# Patient Record
Sex: FEMALE | Race: BLACK OR AFRICAN AMERICAN | NOT HISPANIC OR LATINO | Employment: STUDENT | URBAN - METROPOLITAN AREA
[De-identification: names, ages, dates, MRNs, and addresses within clinical notes are randomized per-mention and may not be internally consistent; named-entity substitution may affect disease eponyms.]

---
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
## 2023-01-02 ENCOUNTER — PATIENT MESSAGE (OUTPATIENT)
Dept: DERMATOLOGY | Facility: CLINIC | Age: 18
End: 2023-01-02
Payer: COMMERCIAL

## 2023-01-03 DIAGNOSIS — L20.9 ATOPIC DERMATITIS, UNSPECIFIED TYPE: ICD-10-CM

## 2023-01-03 RX ORDER — CRISABOROLE 20 MG/G
1 OINTMENT TOPICAL 2 TIMES DAILY
Qty: 100 G | Refills: 3 | Status: SHIPPED | OUTPATIENT
Start: 2023-01-03

## 2023-01-05 ENCOUNTER — TELEPHONE (OUTPATIENT)
Dept: PHARMACY | Facility: CLINIC | Age: 18
End: 2023-01-05
Payer: COMMERCIAL

## 2023-04-13 ENCOUNTER — OFFICE VISIT (OUTPATIENT)
Dept: DERMATOLOGY | Facility: CLINIC | Age: 18
End: 2023-04-13
Payer: COMMERCIAL

## 2023-04-13 DIAGNOSIS — L70.0 ACNE VULGARIS: Primary | ICD-10-CM

## 2023-04-13 DIAGNOSIS — L81.9 HYPERPIGMENTATION: ICD-10-CM

## 2023-04-13 DIAGNOSIS — L85.8 KP (KERATOSIS PILARIS): ICD-10-CM

## 2023-04-13 PROCEDURE — 99999 PR PBB SHADOW E&M-EST. PATIENT-LVL III: CPT | Mod: PBBFAC,,, | Performed by: DERMATOLOGY

## 2023-04-13 PROCEDURE — 99214 PR OFFICE/OUTPT VISIT, EST, LEVL IV, 30-39 MIN: ICD-10-PCS | Mod: S$GLB,,, | Performed by: DERMATOLOGY

## 2023-04-13 PROCEDURE — 99999 PR PBB SHADOW E&M-EST. PATIENT-LVL III: ICD-10-PCS | Mod: PBBFAC,,, | Performed by: DERMATOLOGY

## 2023-04-13 PROCEDURE — 99213 OFFICE O/P EST LOW 20 MIN: CPT | Mod: PBBFAC,PN | Performed by: DERMATOLOGY

## 2023-04-13 PROCEDURE — 99214 OFFICE O/P EST MOD 30 MIN: CPT | Mod: S$GLB,,, | Performed by: DERMATOLOGY

## 2023-04-13 RX ORDER — TRETINOIN 1 MG/G
CREAM TOPICAL NIGHTLY
Qty: 20 G | Refills: 1 | Status: SHIPPED | OUTPATIENT
Start: 2023-04-13 | End: 2024-02-09 | Stop reason: SDUPTHER

## 2023-04-13 RX ORDER — CLINDAMYCIN PHOSPHATE 10 UG/ML
LOTION TOPICAL EVERY MORNING
Qty: 60 ML | Refills: 1 | Status: SHIPPED | OUTPATIENT
Start: 2023-04-13 | End: 2024-02-09 | Stop reason: SDUPTHER

## 2023-04-13 NOTE — PROGRESS NOTES
Subjective:      Patient ID:  Lily Samson is a 17 y.o. female who presents for   Chief Complaint   Patient presents with    Acne     Follow up     Acne - Follow-up  Symptom course: worsening  Affected locations: face, back and neck  Signs / symptoms: asymptomatic  Severity: moderate to severe    Review of Systems   Constitutional:  Negative for fever and chills.   HENT:  Negative for sore throat.    Respiratory:  Negative for cough.      Objective:   Physical Exam   Constitutional: She appears well-developed and well-nourished.   Neurological: She is alert and oriented to person, place, and time.   Psychiatric: She has a normal mood and affect.   Skin:                  Diagram Legend     Erythematous scaling macule/papule c/w actinic keratosis       Vascular papule c/w angioma      Pigmented verrucoid papule/plaque c/w seborrheic keratosis      Yellow umbilicated papule c/w sebaceous hyperplasia      Irregularly shaped tan macule c/w lentigo     1-2 mm smooth white papules consistent with Milia      Movable subcutaneous cyst with punctum c/w epidermal inclusion cyst      Subcutaneous movable cyst c/w pilar cyst      Firm pink to brown papule c/w dermatofibroma      Pedunculated fleshy papule(s) c/w skin tag(s)      Evenly pigmented macule c/w junctional nevus     Mildly variegated pigmented, slightly irregular-bordered macule c/w mildly atypical nevus      Flesh colored to evenly pigmented papule c/w intradermal nevus       Pink pearly papule/plaque c/w basal cell carcinoma      Erythematous hyperkeratotic cursted plaque c/w SCC      Surgical scar with no sign of skin cancer recurrence      Open and closed comedones      Inflammatory papules and pustules      Verrucoid papule consistent consistent with wart     Erythematous eczematous patches and plaques     Dystrophic onycholytic nail with subungual debris c/w onychomycosis     Umbilicated papule    Erythematous-base heme-crusted tan verrucoid plaque consistent  with inflamed seborrheic keratosis     Erythematous Silvery Scaling Plaque c/w Psoriasis     See annotation            Assessment / Plan:        Acne vulgaris  -     tretinoin (RETIN-A) 0.1 % cream; Apply topically every evening. Start with every other night and move up to nightly after 2 weeks if not too dry.  Dispense: 20 g; Refill: 1  Overall better but slow.  Reviewed with patient different treatment options and associated risks.  Proper application of medications and or care for affected area(s) and condition(s) reviewed.  Independent historian was in exam room or on virtual today to provide information and assist in delivering therapy and treatment at home.  Bump up RA to 0.1.  Proper application of medications and or care for affected area(s) and condition(s) reviewed.  No picking.  Refill cleocin lotion.    Hyperpigmentation  Discussed with the patient the risk of color scars, erythema, or hyperpigmentation that could take months to resolve.    KP (keratosis pilaris)  Better but pt wants to be more aggressive.  Recommend Glytone products for proven effects on collagen production with alpha hydroxy products.  Discussed getting and usage of a scrub brush for the back and loofa for the chest and shoulders and other body parts.             Follow up in about 4 months (around 8/13/2023).

## 2024-01-29 DIAGNOSIS — L70.0 ACNE VULGARIS: ICD-10-CM

## 2024-01-30 ENCOUNTER — PATIENT MESSAGE (OUTPATIENT)
Dept: DERMATOLOGY | Facility: CLINIC | Age: 19
End: 2024-01-30
Payer: COMMERCIAL

## 2024-01-30 RX ORDER — CLINDAMYCIN PHOSPHATE 10 UG/ML
LOTION TOPICAL
Qty: 60 ML | Refills: 1 | OUTPATIENT
Start: 2024-01-30

## 2024-02-08 ENCOUNTER — OFFICE VISIT (OUTPATIENT)
Dept: DERMATOLOGY | Facility: CLINIC | Age: 19
End: 2024-02-08
Payer: COMMERCIAL

## 2024-02-08 DIAGNOSIS — L70.0 ACNE VULGARIS: Primary | ICD-10-CM

## 2024-02-08 DIAGNOSIS — L81.9 HYPERPIGMENTATION: ICD-10-CM

## 2024-02-08 PROCEDURE — 99214 OFFICE O/P EST MOD 30 MIN: CPT | Mod: 95,,, | Performed by: DERMATOLOGY

## 2024-02-08 PROCEDURE — 1159F MED LIST DOCD IN RCRD: CPT | Mod: CPTII,95,, | Performed by: DERMATOLOGY

## 2024-02-08 PROCEDURE — 1160F RVW MEDS BY RX/DR IN RCRD: CPT | Mod: CPTII,95,, | Performed by: DERMATOLOGY

## 2024-02-08 NOTE — PROGRESS NOTES
The patient location is: home  The chief complaint leading to consultation is: acne fu for the face.    Visit type: audiovisual    Face to Face time with patient: 7 m  10 minutes of total time spent on the encounter, which includes face to face time and non-face to face time preparing to see the patient (eg, review of tests), Obtaining and/or reviewing separately obtained history, Documenting clinical information in the electronic or other health record, Independently interpreting results (not separately reported) and communicating results to the patient/family/caregiver, or Care coordination (not separately reported).         Each patient to whom he or she provides medical services by telemedicine is:  (1) informed of the relationship between the physician and patient and the respective role of any other health care provider with respect to management of the patient; and (2) notified that he or she may decline to receive medical services by telemedicine and may withdraw from such care at any time.    Notes:     Subjective:      Patient ID:  Lily Samson is a 18 y.o. female who presents for No chief complaint on file.    HPI    Review of Systems   Constitutional:  Negative for fever.   HENT:  Negative for sore throat.    Respiratory:  Negative for cough.        Objective:   Physical Exam   Constitutional: She appears well-developed and well-nourished.   Eyes: No conjunctival no injection.   Neurological: She is alert and oriented to person, place, and time.   Psychiatric: She has a normal mood and affect.        Diagram Legend     Erythematous scaling macule/papule c/w actinic keratosis       Vascular papule c/w angioma      Pigmented verrucoid papule/plaque c/w seborrheic keratosis      Yellow umbilicated papule c/w sebaceous hyperplasia      Irregularly shaped tan macule c/w lentigo     1-2 mm smooth white papules consistent with Milia      Movable subcutaneous cyst with punctum c/w epidermal inclusion cyst       Subcutaneous movable cyst c/w pilar cyst      Firm pink to brown papule c/w dermatofibroma      Pedunculated fleshy papule(s) c/w skin tag(s)      Evenly pigmented macule c/w junctional nevus     Mildly variegated pigmented, slightly irregular-bordered macule c/w mildly atypical nevus      Flesh colored to evenly pigmented papule c/w intradermal nevus       Pink pearly papule/plaque c/w basal cell carcinoma      Erythematous hyperkeratotic cursted plaque c/w SCC      Surgical scar with no sign of skin cancer recurrence      Open and closed comedones      Inflammatory papules and pustules      Verrucoid papule consistent consistent with wart     Erythematous eczematous patches and plaques     Dystrophic onycholytic nail with subungual debris c/w onychomycosis     Umbilicated papule    Erythematous-base heme-crusted tan verrucoid plaque consistent with inflamed seborrheic keratosis     Erythematous Silvery Scaling Plaque c/w Psoriasis     See annotation            Assessment / Plan:        Acne vulgaris  -     clindamycin (CLEOCIN T) 1 % lotion; Apply topically every morning. face  Dispense: 60 mL; Refill: 3  -     tretinoin (RETIN-A) 0.1 % cream; Apply topically every evening. Start with every other night and move up to nightly after 2 weeks if not too dry.  Dispense: 20 g; Refill: 3  Refill tops.  Condition is stable.  We will continue present management.    Reviewed with patient different treatment options and associated risks.  Proper application of medications and or care for affected area(s) and condition(s) reviewed.  Cons aldactone later?  Pt to cons.  Pt reports some natalie with periods.    Discussed benefits and risks of therapy including but not limited to breakthrough bleeding, breast tenderness, and elevated potassium levels which may give symptoms of fatigue, palpitations, and nausea. Patient should limit potassium intake - avoid potassium supplements or salt substitutes, limit bananas and citrus fruits.  Pregnancy must be avoided while taking spironolactone.  Rev'd winlevi as poss costly and questionable efficacy.    Hyperpigmentation  Cons hq focally later?  Patient instructed in importance in daily sun protection. Sun avoidance and topical protection discussed.     Patient encouraged to wear hat for all outdoor exposure.     Also discussed sun protective clothing.  RA should also help.             Follow up in about 1 year (around 2/8/2025).

## 2024-02-09 RX ORDER — CLINDAMYCIN PHOSPHATE 10 UG/ML
LOTION TOPICAL EVERY MORNING
Qty: 60 ML | Refills: 3 | Status: SHIPPED | OUTPATIENT
Start: 2024-02-09 | End: 2024-04-18 | Stop reason: SDUPTHER

## 2024-02-09 RX ORDER — TRETINOIN 1 MG/G
CREAM TOPICAL NIGHTLY
Qty: 20 G | Refills: 3 | Status: SHIPPED | OUTPATIENT
Start: 2024-02-09 | End: 2024-04-18 | Stop reason: SDUPTHER

## 2024-02-19 ENCOUNTER — PATIENT MESSAGE (OUTPATIENT)
Dept: DERMATOLOGY | Facility: CLINIC | Age: 19
End: 2024-02-19
Payer: COMMERCIAL

## 2024-04-18 ENCOUNTER — LAB VISIT (OUTPATIENT)
Dept: LAB | Facility: OTHER | Age: 19
End: 2024-04-18
Attending: INTERNAL MEDICINE
Payer: COMMERCIAL

## 2024-04-18 ENCOUNTER — OFFICE VISIT (OUTPATIENT)
Dept: DERMATOLOGY | Facility: CLINIC | Age: 19
End: 2024-04-18
Payer: COMMERCIAL

## 2024-04-18 ENCOUNTER — OFFICE VISIT (OUTPATIENT)
Dept: INTERNAL MEDICINE | Facility: CLINIC | Age: 19
End: 2024-04-18
Attending: INTERNAL MEDICINE
Payer: COMMERCIAL

## 2024-04-18 VITALS
WEIGHT: 141.13 LBS | HEIGHT: 63 IN | DIASTOLIC BLOOD PRESSURE: 76 MMHG | SYSTOLIC BLOOD PRESSURE: 112 MMHG | OXYGEN SATURATION: 99 % | BODY MASS INDEX: 25.01 KG/M2 | HEART RATE: 76 BPM

## 2024-04-18 DIAGNOSIS — L60.8 MELANONYCHIA: Primary | ICD-10-CM

## 2024-04-18 DIAGNOSIS — R53.83 FATIGUE, UNSPECIFIED TYPE: ICD-10-CM

## 2024-04-18 DIAGNOSIS — Z00.00 ANNUAL PHYSICAL EXAM: Primary | ICD-10-CM

## 2024-04-18 DIAGNOSIS — L70.0 ACNE VULGARIS: ICD-10-CM

## 2024-04-18 DIAGNOSIS — K59.00 CONSTIPATION, UNSPECIFIED CONSTIPATION TYPE: ICD-10-CM

## 2024-04-18 DIAGNOSIS — Z00.00 ANNUAL PHYSICAL EXAM: ICD-10-CM

## 2024-04-18 DIAGNOSIS — R06.83 SNORING: ICD-10-CM

## 2024-04-18 LAB
25(OH)D3+25(OH)D2 SERPL-MCNC: 6 NG/ML (ref 30–96)
ALBUMIN SERPL BCP-MCNC: 4.4 G/DL (ref 3.5–5.2)
ALP SERPL-CCNC: 55 U/L (ref 55–135)
ALT SERPL W/O P-5'-P-CCNC: 8 U/L (ref 10–44)
ANION GAP SERPL CALC-SCNC: 9 MMOL/L (ref 8–16)
AST SERPL-CCNC: 17 U/L (ref 10–40)
BASOPHILS # BLD AUTO: 0.1 K/UL (ref 0–0.2)
BASOPHILS NFR BLD: 1.5 % (ref 0–1.9)
BILIRUB SERPL-MCNC: 0.7 MG/DL (ref 0.1–1)
BUN SERPL-MCNC: 11 MG/DL (ref 6–20)
CALCIUM SERPL-MCNC: 9.4 MG/DL (ref 8.7–10.5)
CHLORIDE SERPL-SCNC: 109 MMOL/L (ref 95–110)
CO2 SERPL-SCNC: 19 MMOL/L (ref 23–29)
CREAT SERPL-MCNC: 0.8 MG/DL (ref 0.5–1.4)
DIFFERENTIAL METHOD BLD: ABNORMAL
EOSINOPHIL # BLD AUTO: 0.3 K/UL (ref 0–0.5)
EOSINOPHIL NFR BLD: 4 % (ref 0–8)
ERYTHROCYTE [DISTWIDTH] IN BLOOD BY AUTOMATED COUNT: 11.9 % (ref 11.5–14.5)
EST. GFR  (NO RACE VARIABLE): >60 ML/MIN/1.73 M^2
ESTIMATED AVG GLUCOSE: 91 MG/DL (ref 68–131)
FOLATE SERPL-MCNC: 7.8 NG/ML (ref 4–24)
GLUCOSE SERPL-MCNC: 74 MG/DL (ref 70–110)
HBA1C MFR BLD: 4.8 % (ref 4–5.6)
HCT VFR BLD AUTO: 40.3 % (ref 37–48.5)
HGB BLD-MCNC: 12.8 G/DL (ref 12–16)
IMM GRANULOCYTES # BLD AUTO: 0.01 K/UL (ref 0–0.04)
IMM GRANULOCYTES NFR BLD AUTO: 0.2 % (ref 0–0.5)
LYMPHOCYTES # BLD AUTO: 3.4 K/UL (ref 1–4.8)
LYMPHOCYTES NFR BLD: 51.9 % (ref 18–48)
MCH RBC QN AUTO: 31.6 PG (ref 27–31)
MCHC RBC AUTO-ENTMCNC: 31.8 G/DL (ref 32–36)
MCV RBC AUTO: 100 FL (ref 82–98)
MONOCYTES # BLD AUTO: 0.5 K/UL (ref 0.3–1)
MONOCYTES NFR BLD: 8 % (ref 4–15)
NEUTROPHILS # BLD AUTO: 2.2 K/UL (ref 1.8–7.7)
NEUTROPHILS NFR BLD: 34.4 % (ref 38–73)
NRBC BLD-RTO: 0 /100 WBC
PLATELET # BLD AUTO: 210 K/UL (ref 150–450)
PMV BLD AUTO: 12.7 FL (ref 9.2–12.9)
POTASSIUM SERPL-SCNC: 3.8 MMOL/L (ref 3.5–5.1)
PROT SERPL-MCNC: 8.1 G/DL (ref 6–8.4)
RBC # BLD AUTO: 4.05 M/UL (ref 4–5.4)
SODIUM SERPL-SCNC: 137 MMOL/L (ref 136–145)
TSH SERPL DL<=0.005 MIU/L-ACNC: 1.38 UIU/ML (ref 0.4–4)
VIT B12 SERPL-MCNC: 361 PG/ML (ref 210–950)
WBC # BLD AUTO: 6.49 K/UL (ref 3.9–12.7)

## 2024-04-18 PROCEDURE — 3008F BODY MASS INDEX DOCD: CPT | Mod: CPTII,S$GLB,, | Performed by: INTERNAL MEDICINE

## 2024-04-18 PROCEDURE — 80053 COMPREHEN METABOLIC PANEL: CPT | Performed by: INTERNAL MEDICINE

## 2024-04-18 PROCEDURE — 3044F HG A1C LEVEL LT 7.0%: CPT | Mod: CPTII,S$GLB,, | Performed by: INTERNAL MEDICINE

## 2024-04-18 PROCEDURE — 99999 PR PBB SHADOW E&M-EST. PATIENT-LVL III: CPT | Mod: PBBFAC,,, | Performed by: INTERNAL MEDICINE

## 2024-04-18 PROCEDURE — 85025 COMPLETE CBC W/AUTO DIFF WBC: CPT | Performed by: INTERNAL MEDICINE

## 2024-04-18 PROCEDURE — 84443 ASSAY THYROID STIM HORMONE: CPT | Performed by: INTERNAL MEDICINE

## 2024-04-18 PROCEDURE — 1160F RVW MEDS BY RX/DR IN RCRD: CPT | Mod: CPTII,S$GLB,, | Performed by: DERMATOLOGY

## 2024-04-18 PROCEDURE — 3074F SYST BP LT 130 MM HG: CPT | Mod: CPTII,S$GLB,, | Performed by: INTERNAL MEDICINE

## 2024-04-18 PROCEDURE — 82746 ASSAY OF FOLIC ACID SERUM: CPT | Performed by: INTERNAL MEDICINE

## 2024-04-18 PROCEDURE — 1159F MED LIST DOCD IN RCRD: CPT | Mod: CPTII,S$GLB,, | Performed by: INTERNAL MEDICINE

## 2024-04-18 PROCEDURE — 87799 DETECT AGENT NOS DNA QUANT: CPT | Performed by: INTERNAL MEDICINE

## 2024-04-18 PROCEDURE — 82306 VITAMIN D 25 HYDROXY: CPT | Performed by: INTERNAL MEDICINE

## 2024-04-18 PROCEDURE — 36415 COLL VENOUS BLD VENIPUNCTURE: CPT | Performed by: INTERNAL MEDICINE

## 2024-04-18 PROCEDURE — 83036 HEMOGLOBIN GLYCOSYLATED A1C: CPT | Performed by: INTERNAL MEDICINE

## 2024-04-18 PROCEDURE — 99214 OFFICE O/P EST MOD 30 MIN: CPT | Mod: S$GLB,,, | Performed by: DERMATOLOGY

## 2024-04-18 PROCEDURE — 3078F DIAST BP <80 MM HG: CPT | Mod: CPTII,S$GLB,, | Performed by: INTERNAL MEDICINE

## 2024-04-18 PROCEDURE — 99999 PR PBB SHADOW E&M-EST. PATIENT-LVL III: CPT | Mod: PBBFAC,,, | Performed by: DERMATOLOGY

## 2024-04-18 PROCEDURE — 1159F MED LIST DOCD IN RCRD: CPT | Mod: CPTII,S$GLB,, | Performed by: DERMATOLOGY

## 2024-04-18 PROCEDURE — 99395 PREV VISIT EST AGE 18-39: CPT | Mod: S$GLB,,, | Performed by: INTERNAL MEDICINE

## 2024-04-18 PROCEDURE — 1160F RVW MEDS BY RX/DR IN RCRD: CPT | Mod: CPTII,S$GLB,, | Performed by: INTERNAL MEDICINE

## 2024-04-18 PROCEDURE — 82607 VITAMIN B-12: CPT | Performed by: INTERNAL MEDICINE

## 2024-04-18 PROCEDURE — G2211 COMPLEX E/M VISIT ADD ON: HCPCS | Mod: S$GLB,,, | Performed by: DERMATOLOGY

## 2024-04-18 RX ORDER — CLINDAMYCIN PHOSPHATE 10 UG/ML
LOTION TOPICAL EVERY MORNING
Qty: 60 ML | Refills: 3 | Status: SHIPPED | OUTPATIENT
Start: 2024-04-18 | End: 2024-05-09

## 2024-04-18 RX ORDER — SPIRONOLACTONE 50 MG/1
50 TABLET, FILM COATED ORAL NIGHTLY
Qty: 30 TABLET | Refills: 2 | Status: SHIPPED | OUTPATIENT
Start: 2024-04-18 | End: 2024-05-09

## 2024-04-18 RX ORDER — TRETINOIN 1 MG/G
CREAM TOPICAL NIGHTLY
Qty: 20 G | Refills: 3 | Status: SHIPPED | OUTPATIENT
Start: 2024-04-18 | End: 2024-05-09

## 2024-04-18 NOTE — PROGRESS NOTES
Subjective:      Patient ID:  Lily Samson is a 19 y.o. female who presents for   Chief Complaint   Patient presents with    Acne     F/u    Nail Problem     Both thumbs     Acne - Follow-up  Symptom course: worsening  Affected locations: face  Signs / symptoms: pain and inflamed  Severity: moderate    Nail Problem - Initial  Affected locations: left fingers and right fingers (Both thumbs)  Duration: 3 months  Signs and Symptoms: Color line.  Severity: mild to moderate  Timing: constant      Review of Systems   Constitutional:  Negative for fever and chills.   HENT:  Negative for sore throat.    Respiratory:  Negative for cough.        Objective:   Physical Exam   Constitutional: She appears well-developed and well-nourished.   Neurological: She is alert and oriented to person, place, and time.   Psychiatric: She has a normal mood and affect.        Diagram Legend     Erythematous scaling macule/papule c/w actinic keratosis       Vascular papule c/w angioma      Pigmented verrucoid papule/plaque c/w seborrheic keratosis      Yellow umbilicated papule c/w sebaceous hyperplasia      Irregularly shaped tan macule c/w lentigo     1-2 mm smooth white papules consistent with Milia      Movable subcutaneous cyst with punctum c/w epidermal inclusion cyst      Subcutaneous movable cyst c/w pilar cyst      Firm pink to brown papule c/w dermatofibroma      Pedunculated fleshy papule(s) c/w skin tag(s)      Evenly pigmented macule c/w junctional nevus     Mildly variegated pigmented, slightly irregular-bordered macule c/w mildly atypical nevus      Flesh colored to evenly pigmented papule c/w intradermal nevus       Pink pearly papule/plaque c/w basal cell carcinoma      Erythematous hyperkeratotic cursted plaque c/w SCC      Surgical scar with no sign of skin cancer recurrence      Open and closed comedones      Inflammatory papules and pustules      Verrucoid papule consistent consistent with wart     Erythematous  eczematous patches and plaques     Dystrophic onycholytic nail with subungual debris c/w onychomycosis     Umbilicated papule    Erythematous-base heme-crusted tan verrucoid plaque consistent with inflamed seborrheic keratosis     Erythematous Silvery Scaling Plaque c/w Psoriasis     See annotation                        Assessment / Plan:        Lily was seen today for acne and nail problem.    Diagnoses and all orders for this visit:    Melanonychia  Discussed with patient the benign nature of these lesions and that no treatment is indicated.  Patient to watch for any suspicious changes of skin or skin spots, including color changes, darkening, bleeding, scabbing, increase in size, pain, itch, or worsening.  Patient to come in for more evaluation if any such occurs.    Acne vulgaris  -     clindamycin (CLEOCIN T) 1 % lotion; Apply topically every morning. face  -     tretinoin (RETIN-A) 0.1 % cream; Apply topically every evening. Start with every other night and move up to nightly after 2 weeks if not too dry.  -     spironolactone (ALDACTONE) 50 MG tablet; Take 1 tablet (50 mg total) by mouth every evening.  Pt reports getting deeper breakouts here and there and wants to be more aggressive.  Reviewed with patient different treatment options and associated risks.  Discussed benefits and risks of therapy including but not limited to breakthrough bleeding, breast tenderness, and elevated potassium levels which may give symptoms of fatigue, palpitations, and nausea. Patient should limit potassium intake - avoid potassium supplements or salt substitutes, limit bananas and citrus fruits. Pregnancy must be avoided while taking spironolactone.  Cont topicals.  Also offered accutane but pt prefers aldactone.             Follow up in about 3 months (around 7/18/2024).

## 2024-04-18 NOTE — PROGRESS NOTES
"Subjective:   Patient ID: Lily Samson is a 19 y.o. 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   Just resumed classes 3 weeks ago   Prev was working 8a-6p and start to get tired around 2-3p   Over past few months feeling more tired in general and no energy   In last year noticed a change in bowel movements - before will have urgency or constipation -     Pescetarian - no shellfish   Eats salads the most of the time - preparing own meals majority   Will eat ice cream at night and tolerates and no changes in bowel habits assoc with this     Anxiety:   - never treated with medication or counseling   - managed through LS changes   Trigger is school   Does not feel like needs to be tx with medication currently     Constipation:   Drinks water and heatlhy diet, walking   Discussed staying hydrated and inc fiber   Exercising low impact     Fatigue:   No Fevers, cough, diarrhea  not sexually active  Snoring - was told that snored loudly   Exercise - pilates  Recent illness - none   Depression - none   regular menstrual cycles once monthly.  Menstruation lasts for 5-7 days.  Periods heavier than baseline over past 2 months    Never previously pregnant.  Never previously sexually active.     Vit d def:   Not taking specific vit d suppl    Taking turmeric and wellness liquid oral suppl twice per week - thinks it b12    Insomnia:  Intermittent   Better schedule   Trying to avoid blue screens before bed      HM:  Flu   Covid   Gyn    Review of Systems    Objective:  Vitals:    04/18/24 1155   BP: 112/76   BP Location: Left arm   Patient Position: Sitting   Pulse: 76   SpO2: 99%   Weight: 64 kg (141 lb 1.5 oz)   Height: 5' 3" (1.6 m)     Body " mass index is 24.99 kg/m².    Physical Exam  Vitals reviewed.   Constitutional:       Appearance: Normal appearance. She is well-developed.   HENT:      Head: Normocephalic and atraumatic.      Right Ear: Tympanic membrane, ear canal and external ear normal.      Left Ear: Tympanic membrane, ear canal and external ear normal.      Nose: Nose normal. No congestion.      Mouth/Throat:      Mouth: Mucous membranes are moist.      Pharynx: Oropharynx is clear. No oropharyngeal exudate.   Eyes:      Extraocular Movements: Extraocular movements intact.      Conjunctiva/sclera: Conjunctivae normal.   Neck:      Thyroid: No thyromegaly.   Cardiovascular:      Rate and Rhythm: Normal rate and regular rhythm.      Pulses: Normal pulses.      Heart sounds: Normal heart sounds.   Pulmonary:      Effort: Pulmonary effort is normal.      Breath sounds: Normal breath sounds.   Abdominal:      General: Bowel sounds are normal.      Palpations: Abdomen is soft.   Musculoskeletal:         General: No swelling or tenderness.      Cervical back: Neck supple.   Lymphadenopathy:      Cervical: No cervical adenopathy.   Skin:     General: Skin is warm and dry.      Capillary Refill: Capillary refill takes less than 2 seconds.   Neurological:      General: No focal deficit present.      Mental Status: She is alert and oriented to person, place, and time. Mental status is at baseline.   Psychiatric:         Behavior: Behavior normal.         Thought Content: Thought content normal.         Assessment:  1. Annual physical exam    2. Fatigue, unspecified type    3. Constipation, unspecified constipation type        Plan:  1. Annual physical exam  -     Vitamin D; Future; Expected date: 04/18/2024  -     Hemoglobin A1C; Future; Expected date: 04/18/2024  -     TSH; Future; Expected date: 04/18/2024  -     CBC Auto Differential; Future; Expected date: 04/18/2024  -     Comprehensive Metabolic Panel; Future; Expected date: 04/18/2024  -      Urinalysis; Future; Expected date: 04/25/2024    2. Fatigue, unspecified type  -     Ambulatory referral/consult to Sleep Disorders; Future; Expected date: 04/25/2024  -     Vitamin D; Future; Expected date: 04/18/2024  -     Hemoglobin A1C; Future; Expected date: 04/18/2024  -     Vitamin B12; Future; Expected date: 04/18/2024  -     FOLATE; Future; Expected date: 04/18/2024  -     COLLEEN-ANAYA VIRUS DNA, QUANTITATIVE (PCR); Future; Expected date: 04/18/2024  -     CMV DNA, QUANTITATIVE, PCR; Future; Expected date: 04/18/2024    3. Constipation, unspecified constipation type    Suspect component of ibs - rec low fodmap diet   Inc fluids and fiber  Graded exercise as tolerated   Colace, miralax prn     Labs today   Recommend daily sunscreen, cardiovascular exercise min 30 min 5 days per week. Seatbelts routinely.  F/u with gyn to [facility]     Health Maintenance   Topic Date Due    Pap Smear  04/17/2024    Hepatitis C Screening  04/18/2025 (Originally 2003)    TETANUS VACCINE  07/01/2024    DTaP/Tdap/Td Vaccines (7 - Td or Tdap) 07/01/2024    Hib Vaccines  Completed    Hepatitis B Vaccines  Completed    IPV Vaccines  Completed    Lipid Panel  Completed    Hepatitis A Vaccines  Completed    MMR Vaccines  Completed    Varicella Vaccines  Completed    Meningococcal Vaccine  Completed    HPV Vaccines  Completed

## 2024-04-19 LAB
CMV DNA SPEC QL NAA+PROBE: NORMAL
CYTOMEGALOVIRUS PCR, QUANT: NOT DETECTED IU/ML
EPSTEIN-BARR VIRUS DNA: NORMAL
EPSTEIN-BARR VIRUS PCR, QUANT: NOT DETECTED IU/ML

## 2024-04-22 ENCOUNTER — TELEPHONE (OUTPATIENT)
Dept: ORTHOPEDICS | Facility: CLINIC | Age: 19
End: 2024-04-22
Payer: COMMERCIAL

## 2024-04-22 DIAGNOSIS — L70.0 ACNE VULGARIS: ICD-10-CM

## 2024-04-22 DIAGNOSIS — E55.9 VITAMIN D DEFICIENCY: Primary | ICD-10-CM

## 2024-04-22 RX ORDER — ERGOCALCIFEROL 1.25 MG/1
50000 CAPSULE ORAL
Qty: 12 CAPSULE | Refills: 0 | Status: SHIPPED | OUTPATIENT
Start: 2024-04-22

## 2024-04-22 RX ORDER — ACETAMINOPHEN 500 MG
2000 TABLET ORAL DAILY
COMMUNITY
Start: 2024-04-22

## 2024-04-30 ENCOUNTER — PATIENT MESSAGE (OUTPATIENT)
Dept: INTERNAL MEDICINE | Facility: CLINIC | Age: 19
End: 2024-04-30
Payer: COMMERCIAL

## 2024-04-30 DIAGNOSIS — R53.83 FATIGUE, UNSPECIFIED TYPE: Primary | ICD-10-CM

## 2024-04-30 NOTE — TELEPHONE ENCOUNTER
Have a low suspicion for excess cortisol given normal blood pressure and lack of physical exam findings seen with excess cortisol production which is why it was not ordered     - that said, we can start with a random cortisol level in am - please arrange at her convenience

## 2024-05-06 ENCOUNTER — PATIENT MESSAGE (OUTPATIENT)
Dept: DERMATOLOGY | Facility: CLINIC | Age: 19
End: 2024-05-06
Payer: COMMERCIAL

## 2024-05-09 ENCOUNTER — TELEPHONE (OUTPATIENT)
Dept: DERMATOLOGY | Facility: CLINIC | Age: 19
End: 2024-05-09
Payer: COMMERCIAL

## 2024-05-09 RX ORDER — SPIRONOLACTONE 50 MG/1
50 TABLET, FILM COATED ORAL NIGHTLY
Qty: 30 TABLET | Refills: 2 | Status: SHIPPED | OUTPATIENT
Start: 2024-05-09 | End: 2024-08-07

## 2024-05-09 RX ORDER — CLINDAMYCIN PHOSPHATE 10 UG/ML
LOTION TOPICAL EVERY MORNING
Qty: 60 ML | Refills: 3 | Status: SHIPPED | OUTPATIENT
Start: 2024-05-09

## 2024-05-09 RX ORDER — TRETINOIN 1 MG/G
CREAM TOPICAL NIGHTLY
Qty: 20 G | Refills: 3 | Status: SHIPPED | OUTPATIENT
Start: 2024-05-09

## 2024-05-09 NOTE — TELEPHONE ENCOUNTER
Request forwarded to Dr.Kwon Ruth       ----- Message from Gabby Jones sent at 5/9/2024  8:52 AM CDT -----  Regarding: Pt called to request that her 3  acne medications be resent to the pharmacy because she wasn't able to pick them up in time due to insurance  Rx Refill Request:    Pt called to request that her 3  acne medications be resent to the pharmacy because she wasn't able to pick them up in time due to insurance. Pt needs an Rx Refill for tretinoin (RETIN-A) 0.1 % cream and clindamycin (CLEOCIN T) 1 % lotion and 1 other medication that the pt didn't know the name of because it was a new Rx.    Pt would like the Rx Refill sent to her local: [facility] #[id number] [place] [address] AT [facility] [address] phone: [phone]    PT would like a call back this morning due to leaving a previous message and can be reached at [phone]

## 2024-05-21 ENCOUNTER — PATIENT MESSAGE (OUTPATIENT)
Dept: DERMATOLOGY | Facility: CLINIC | Age: 19
End: 2024-05-21
Payer: COMMERCIAL

## 2024-07-15 ENCOUNTER — PATIENT MESSAGE (OUTPATIENT)
Dept: INTERNAL MEDICINE | Facility: CLINIC | Age: 19
End: 2024-07-15
Payer: COMMERCIAL

## 2024-08-03 ENCOUNTER — PATIENT MESSAGE (OUTPATIENT)
Dept: DERMATOLOGY | Facility: CLINIC | Age: 19
End: 2024-08-03
Payer: COMMERCIAL

## 2024-08-21 ENCOUNTER — TELEPHONE (OUTPATIENT)
Dept: DERMATOLOGY | Facility: CLINIC | Age: 19
End: 2024-08-21
Payer: COMMERCIAL

## 2024-08-21 NOTE — TELEPHONE ENCOUNTER
----- Message from Rach Polanco sent at 8/21/2024 10:22 AM CDT -----  Regarding: refill request  Rx REFILL REQUEST    Refill or New Rx:refill    RX Name and Strength:spironolactone (ALDACTONE) 50 MG tablet    Preferred Pharmacy with phone number:  [facility] ([facility]) #[id number] - [place], [place] - [address]  [address] [address]  Phone: [phone] Fax: [phone]

## 2024-08-26 ENCOUNTER — TELEPHONE (OUTPATIENT)
Dept: DERMATOLOGY | Facility: CLINIC | Age: 19
End: 2024-08-26
Payer: COMMERCIAL

## 2024-08-26 NOTE — TELEPHONE ENCOUNTER
Per  pt needs an appt.      Flory       ----- Message from Carrie Lyle sent at 8/26/2024  9:19 AM CDT -----  Regarding: Refill Request 90 day Supply  Contact: [phone]               Is this a Refill or New Rx: Refill          Rx Name and Strength:spironolactone (ALDACTONE) 50 MG tablet         Preferred Pharmacy with phone number:  [facility] ([facility]) #[id number] - [facility] [address] [facility]  [address] [address]  Phone: [phone] Fax: [phone]

## 2024-09-09 ENCOUNTER — PATIENT MESSAGE (OUTPATIENT)
Dept: DERMATOLOGY | Facility: CLINIC | Age: 19
End: 2024-09-09
Payer: COMMERCIAL

## 2024-09-09 ENCOUNTER — TELEPHONE (OUTPATIENT)
Dept: OBSTETRICS AND GYNECOLOGY | Facility: CLINIC | Age: 19
End: 2024-09-09
Payer: COMMERCIAL

## 2024-09-09 NOTE — TELEPHONE ENCOUNTER
Spoke with pt in regards to rescheduling pt's appointment. Offered pt next available pt declined. Offered another appointment with NP pt declined next available and stated she will go on the portal and schedule it her self.

## 2024-09-09 NOTE — TELEPHONE ENCOUNTER
----- Message from Annamaria Armas sent at 9/9/2024  2:09 PM CDT -----  Pt called she have appointment scheduled on Wednesday with Dr. Edwards won't be able  to make due to the weather the only other time she can scheduled is Monday 9/16/24 Dr. Edwards did not have any available appointments she wanted to speak with someone in the office she can be reached at [phone]

## 2024-09-18 ENCOUNTER — OFFICE VISIT (OUTPATIENT)
Dept: DERMATOLOGY | Facility: CLINIC | Age: 19
End: 2024-09-18
Payer: COMMERCIAL

## 2024-09-18 DIAGNOSIS — L81.9 HYPERPIGMENTATION: Primary | ICD-10-CM

## 2024-09-18 DIAGNOSIS — Z76.89 ENCOUNTER FOR SKIN CARE: ICD-10-CM

## 2024-09-18 DIAGNOSIS — K59.00 CONSTIPATION, UNSPECIFIED CONSTIPATION TYPE: ICD-10-CM

## 2024-09-18 DIAGNOSIS — L70.0 ACNE VULGARIS: ICD-10-CM

## 2024-09-18 PROCEDURE — 1159F MED LIST DOCD IN RCRD: CPT | Mod: CPTII,S$GLB,, | Performed by: DERMATOLOGY

## 2024-09-18 PROCEDURE — 99214 OFFICE O/P EST MOD 30 MIN: CPT | Mod: S$GLB,,, | Performed by: DERMATOLOGY

## 2024-09-18 PROCEDURE — 99999 PR PBB SHADOW E&M-EST. PATIENT-LVL III: CPT | Mod: PBBFAC,,, | Performed by: DERMATOLOGY

## 2024-09-18 PROCEDURE — 3044F HG A1C LEVEL LT 7.0%: CPT | Mod: CPTII,S$GLB,, | Performed by: DERMATOLOGY

## 2024-09-18 PROCEDURE — 1160F RVW MEDS BY RX/DR IN RCRD: CPT | Mod: CPTII,S$GLB,, | Performed by: DERMATOLOGY

## 2024-09-18 PROCEDURE — G2211 COMPLEX E/M VISIT ADD ON: HCPCS | Mod: S$GLB,,, | Performed by: DERMATOLOGY

## 2024-09-18 RX ORDER — SPIRONOLACTONE 50 MG/1
50 TABLET, FILM COATED ORAL NIGHTLY
Qty: 30 TABLET | Refills: 11 | Status: SHIPPED | OUTPATIENT
Start: 2024-09-18 | End: 2025-09-13

## 2024-09-18 RX ORDER — CLINDAMYCIN PHOSPHATE 10 UG/ML
LOTION TOPICAL EVERY MORNING
Qty: 60 ML | Refills: 3 | Status: SHIPPED | OUTPATIENT
Start: 2024-09-18

## 2024-09-18 RX ORDER — TRETINOIN 1 MG/G
CREAM TOPICAL NIGHTLY
Qty: 20 G | Refills: 3 | Status: SHIPPED | OUTPATIENT
Start: 2024-09-18

## 2024-09-18 NOTE — PATIENT INSTRUCTIONS
Long term and slow treatment treatment required for acne discussed today.  Gentle skin care with avoidance of hot water and usage of oil free products discussed.  Avoidance of lotions and make up discussed in addition to all other products.  Oil free sun block if needed.  Try to use clothing and hats to avoid extra products occluding the pores.  Compliance with prescribed regimen discussed.  No picking or squeezing of acne lesions discussed.  Focal coconut oil or jojoba oil as needed for dry areas.  Hold acne topicals if skin is getting too dry.  Resume when ready.

## 2024-09-18 NOTE — PROGRESS NOTES
Subjective:      Patient ID:  Lily Samson is a 19 y.o. female who presents for   Chief Complaint   Patient presents with    Acne     Acne - Follow-up  Affected locations: face  Severity: mild to moderate      Review of Systems   Constitutional: Negative.    HENT: Negative.     Respiratory: Negative.     Gastrointestinal:  Positive for constipation.   Musculoskeletal: Negative.    Skin:  Negative for dry skin.       Objective:   Physical Exam   Constitutional: She appears well-developed and well-nourished.   Neurological: She is alert and oriented to person, place, and time.   Psychiatric: She has a normal mood and affect.        Diagram Legend     Erythematous scaling macule/papule c/w actinic keratosis       Vascular papule c/w angioma      Pigmented verrucoid papule/plaque c/w seborrheic keratosis      Yellow umbilicated papule c/w sebaceous hyperplasia      Irregularly shaped tan macule c/w lentigo     1-2 mm smooth white papules consistent with Milia      Movable subcutaneous cyst with punctum c/w epidermal inclusion cyst      Subcutaneous movable cyst c/w pilar cyst      Firm pink to brown papule c/w dermatofibroma      Pedunculated fleshy papule(s) c/w skin tag(s)      Evenly pigmented macule c/w junctional nevus     Mildly variegated pigmented, slightly irregular-bordered macule c/w mildly atypical nevus      Flesh colored to evenly pigmented papule c/w intradermal nevus       Pink pearly papule/plaque c/w basal cell carcinoma      Erythematous hyperkeratotic cursted plaque c/w SCC      Surgical scar with no sign of skin cancer recurrence      Open and closed comedones      Inflammatory papules and pustules      Verrucoid papule consistent consistent with wart     Erythematous eczematous patches and plaques     Dystrophic onycholytic nail with subungual debris c/w onychomycosis     Umbilicated papule    Erythematous-base heme-crusted tan verrucoid plaque consistent with inflamed seborrheic keratosis      Erythematous Silvery Scaling Plaque c/w Psoriasis     See annotation                Assessment / Plan:        Hyperpigmentation  Discussed with the patient the risk of color scars, erythema, or hyperpigmentation that could take months to resolve.    Acne vulgaris  -     clindamycin (CLEOCIN T) 1 % lotion; Apply topically every morning. face  Dispense: 60 mL; Refill: 3  -     spironolactone (ALDACTONE) 50 MG tablet; Take 1 tablet (50 mg total) by mouth every evening.  Dispense: 30 tablet; Refill: 11  -     tretinoin (RETIN-A) 0.1 % cream; Apply topically every evening. Start with every other night and move up to nightly after 2 weeks if not too dry.  Dispense: 20 g; Refill: 3  Condition is stable.  We will continue present management.  Chronic nature of this condition discussed with patient.  Pt feels decent control.  Offered accutane for more resolution but pt wishes to wait.    Constipation, unspecified constipation type  Patient to watch for recurrence or flares or worsening and to call the clinic for a follow up appointment for such.  Pt to drink more water and increase fiber intake.    Encounter for skin care  Discussed with patient to use organic coconut oil or pure shea butter at least daily for moisturization for the body and organic jojoba oil at least daily for the face.  Sandals or slippers at home as not to slide around and risk fall on non carpeted floors if applied to the soles.  Jojoba oil is a better nicer option also.    Medication monitoring  Lytes good 4.24.           No follow-ups on file.

## 2024-11-19 ENCOUNTER — PATIENT MESSAGE (OUTPATIENT)
Dept: DERMATOLOGY | Facility: CLINIC | Age: 19
End: 2024-11-19
Payer: COMMERCIAL

## 2024-12-17 ENCOUNTER — PATIENT MESSAGE (OUTPATIENT)
Dept: INTERNAL MEDICINE | Facility: CLINIC | Age: 19
End: 2024-12-17
Payer: COMMERCIAL

## 2024-12-17 DIAGNOSIS — J03.01 RECURRENT STREPTOCOCCAL TONSILLITIS: Primary | ICD-10-CM

## 2024-12-24 ENCOUNTER — PATIENT MESSAGE (OUTPATIENT)
Dept: INTERNAL MEDICINE | Facility: CLINIC | Age: 19
End: 2024-12-24

## 2024-12-24 ENCOUNTER — PATIENT MESSAGE (OUTPATIENT)
Dept: GYNECOLOGIC ONCOLOGY | Facility: CLINIC | Age: 19
End: 2024-12-24
Payer: COMMERCIAL

## 2024-12-24 ENCOUNTER — OFFICE VISIT (OUTPATIENT)
Dept: INTERNAL MEDICINE | Facility: CLINIC | Age: 19
End: 2024-12-24
Payer: COMMERCIAL

## 2024-12-24 VITALS
WEIGHT: 133.19 LBS | DIASTOLIC BLOOD PRESSURE: 80 MMHG | BODY MASS INDEX: 23.6 KG/M2 | HEIGHT: 63 IN | SYSTOLIC BLOOD PRESSURE: 118 MMHG | OXYGEN SATURATION: 100 % | HEART RATE: 80 BPM

## 2024-12-24 DIAGNOSIS — J02.9 PHARYNGITIS, UNSPECIFIED ETIOLOGY: Primary | ICD-10-CM

## 2024-12-24 DIAGNOSIS — J35.1 ENLARGED TONSILS: ICD-10-CM

## 2024-12-24 PROCEDURE — 3008F BODY MASS INDEX DOCD: CPT | Mod: CPTII,S$GLB,,

## 2024-12-24 PROCEDURE — 3079F DIAST BP 80-89 MM HG: CPT | Mod: CPTII,S$GLB,,

## 2024-12-24 PROCEDURE — 99213 OFFICE O/P EST LOW 20 MIN: CPT | Mod: S$GLB,,,

## 2024-12-24 PROCEDURE — 3044F HG A1C LEVEL LT 7.0%: CPT | Mod: CPTII,S$GLB,,

## 2024-12-24 PROCEDURE — 99999 PR PBB SHADOW E&M-EST. PATIENT-LVL IV: CPT | Mod: PBBFAC,,,

## 2024-12-24 PROCEDURE — 3074F SYST BP LT 130 MM HG: CPT | Mod: CPTII,S$GLB,,

## 2024-12-24 PROCEDURE — 1159F MED LIST DOCD IN RCRD: CPT | Mod: CPTII,S$GLB,,

## 2024-12-24 RX ORDER — AMOXICILLIN 250 MG/5ML
750 POWDER, FOR SUSPENSION ORAL 2 TIMES DAILY
Qty: 210 ML | Refills: 0 | Status: SHIPPED | OUTPATIENT
Start: 2024-12-24

## 2024-12-24 NOTE — PROGRESS NOTES
CHIEF COMPLAINT     Chief Complaint   Patient presents with    Sore Throat     Throat issues for years.     Nasal Congestion       HPI     Lily Samson is a 19 y.o. female who presents for xxx today.    PCP is Jessica Álvarez MD, patient is new to me. This note was generated with the assistance of ambient listening technology. Verbal consent was obtained by the patient and accompanying visitor(s) for the recording of patient appointment to facilitate this note. I attest to having reviewed and edited the generated note for accuracy, though some syntax or spelling errors may persist. Please contact the author of this note for any clarification.     History of Present Illness    CHIEF COMPLAINT:  Patient presents today with concerns about throat and respiratory issues.    HISTORY OF PRESENT ILLNESS:  She reports difficulty swallowing and enlarged asymmetric tonsils, with one side significantly larger than the other for several weeks. She initially experienced complete loss of voice with a dry cough. She denies having cold symptoms during the initial presentation of throat symptoms. She has since developed productive cough with chest pain that is sometimes sharp, sometimes achy, accompanied by a feeling of heaviness in the chest. She denies fever but reports feeling like there is increased fluid in her body. She is concerned about possible pneumonia due to persistent chest pain following an illness at the end of October.    MEDICAL HISTORY:  She has had strep throat twice in the past 2.5 years.      ROS:  General: -fever, -chills, -fatigue, -weight gain, -weight loss  Eyes: -vision changes, -redness, -discharge  ENT: -ear pain, -nasal congestion, -sore throat, +difficulty swallowing  Cardiovascular: +chest pain, -palpitations, -lower extremity edema  Respiratory: +cough, -shortness of breath  Gastrointestinal: -abdominal pain, -nausea, -vomiting, -diarrhea, -constipation, -blood in stool  Genitourinary: -dysuria,  -hematuria, -frequency  Musculoskeletal: -joint pain, -muscle pain  Skin: -rash, -lesion  Neurological: -headache, -dizziness, -numbness, -tingling  Psychiatric: -anxiety, -depression, -sleep difficulty          Past Medical History:  Past Medical History:   Diagnosis Date    Eczema     Tuberculosis contact 04/08    mom with pulm tb, noncompliant with inh, ppd neg x 2 since then.       Home Medications:  Prior to Admission medications    Medication Sig Start Date End Date Taking? Authorizing Provider   ceramides 1,3,6-II (CERAVE) Crea Apply topically.   Yes Provider, Historical   cholecalciferol, vitamin D3, (VITAMIN D3) 50 mcg (2,000 unit) Cap capsule Take 1 capsule (2,000 Units total) by mouth once daily. 4/22/24  Yes Gbaby Mathur MD   clindamycin (CLEOCIN T) 1 % lotion Apply topically every morning. face 9/18/24  Yes Issac Granados MD   ergocalciferol (ERGOCALCIFEROL) 50,000 unit Cap Take 1 capsule (50,000 Units total) by mouth every 7 days. 4/22/24  Yes Gabby Mathur MD   spironolactone (ALDACTONE) 50 MG tablet Take 1 tablet (50 mg total) by mouth every evening. 9/18/24 9/13/25 Yes Issac Granados MD   tretinoin (RETIN-A) 0.1 % cream Apply topically every evening. Start with every other night and move up to nightly after 2 weeks if not too dry. 9/18/24  Yes Issac Granados MD   amoxicillin (AMOXIL) 250 mg/5 mL suspension Take 15 mLs (750 mg total) by mouth 2 (two) times daily. 12/24/24   Tommy Mccurdy NP   EUCRISA 2 % Oint apply twice a day  Patient not taking: Reported on 12/24/2024 1/3/23   Issac Granados MD   fluocinonide 0.05% (LIDEX) 0.05 % cream AAA bid prn eczema flare.Stop using steroid topical when skin is smooth and non itchy.  Do not treat dark or red coloring.  Patient not taking: Reported on 12/24/2024 12/20/22   Issac Granados MD   hepatitis A virus vaccine (Adult 19YRS up)(PF) 1440 Unit/1 mL injection Inject into the muscle.  Patient not taking: Reported on 12/24/2024  "9/12/22   Jessica Álvarez MD   hydrocortisone butyrate (LOCOID) 0.1 % Crea cream AAA right antecubital fossa bid  Patient not taking: Reported on 12/24/2024 5/3/21   Rukhsana Olvera MD   ketoconazole (NIZORAL) 2 % cream Apply topically 2 (two) times daily. For different flaky rash of the face prn.  Patient not taking: Reported on 12/24/2024 3/31/22   Issac Granados MD   ketoconazole (NIZORAL) 2 % shampoo Apply topically once a week.  Patient not taking: Reported on 12/24/2024 9/9/21   Jessica Álvarez MD       Review of Systems:  Review of Systems   Constitutional: Negative.  Negative for fever.   HENT:  Positive for congestion and sore throat.    Respiratory:  Positive for cough.    Cardiovascular:  Positive for chest pain. Negative for palpitations.       Health Maintainence:   Immunizations:  Health Maintenance         Date Due Completion Date    Pap Smear Never done ---    TETANUS VACCINE 07/01/2024 7/1/2014    DTaP/Tdap/Td Vaccines (7 - Td or Tdap) 07/01/2024 7/1/2014    Influenza Vaccine (1) 09/01/2024 9/16/2022    COVID-19 Vaccine (4 - 2024-25 season) 09/01/2024 12/15/2021    Hepatitis C Screening 04/18/2025 (Originally 2003) ---    HIV Screening 04/18/2025 (Originally 4/17/2018) ---    RSV Vaccine (Age 60+ and Pregnant patients) (1 - 1-dose 75+ series) 04/17/2078 ---             PHYSICAL EXAM     /80   Pulse 80   Ht 5' 3" (1.6 m)   Wt 60.4 kg (133 lb 2.5 oz)   LMP 12/24/2024   SpO2 100%   BMI 23.59 kg/m²     Physical Exam  HENT:      Mouth/Throat:      Pharynx: Uvula swelling present.      Tonsils: No tonsillar exudate or tonsillar abscesses. 3+ on the right. 2+ on the left.           ASSESSMENT/PLAN   Assessment & Plan    IMPRESSION:  - Assessed enlarged tonsils and history of strep throat  - Considered viral illness as potential cause for chest pain and coughing  - Will treat presumptively for strep due to inflamed tonsils and upcoming holiday  - Low suspicion for pneumonia based on " clear lung sounds and absence of fever    HYPERTROPHY OF TONSILS:  - Explained that amoxicillin may be changed to Augmentin if no improvement in 48 hours.  - Started amoxicillin 750 mg liquid suspension, 15 mL 2 times daily for 7 days.  - Performed rapid strep test.  - Referred to ENT for long-term management and potential tonsillectomy discussion.    FOLLOW-UP:  - Follow up in 48 hours (Thursday) to assess improvement.  - Contact the office if symptoms worsen, especially if fever develops.          Lily was seen today for sore throat and nasal congestion.    Diagnoses and all orders for this visit:    Pharyngitis, unspecified etiology  -     Ambulatory referral/consult to ENT; Future  -     POCT rapid strep A  -     amoxicillin (AMOXIL) 250 mg/5 mL suspension; Take 15 mLs (750 mg total) by mouth 2 (two) times daily.    Enlarged tonsils  -     Ambulatory referral/consult to ENT; Future  -     POCT rapid strep A  -     amoxicillin (AMOXIL) 250 mg/5 mL suspension; Take 15 mLs (750 mg total) by mouth 2 (two) times daily.          Tommy Mccurdy NP   Department of Internal Medicine - [facility]  11:55 AM

## 2024-12-26 ENCOUNTER — TELEPHONE (OUTPATIENT)
Dept: INTERNAL MEDICINE | Facility: CLINIC | Age: 19
End: 2024-12-26
Payer: COMMERCIAL

## 2024-12-26 DIAGNOSIS — J35.1 ENLARGED TONSILS: ICD-10-CM

## 2024-12-26 DIAGNOSIS — J02.9 PHARYNGITIS, UNSPECIFIED ETIOLOGY: ICD-10-CM

## 2024-12-26 NOTE — TELEPHONE ENCOUNTER
The directions are pretty clear. It says take 15 ml (750 mg) by mouth twice daily. What more do they need?

## 2024-12-26 NOTE — TELEPHONE ENCOUNTER
----- Message from Teresa sent at 12/24/2024 12:28 PM CST -----  Regarding: amoxicillin (AMOXIL) 250 mg/5 mL suspension  Type:  Pharmacy Calling to Clarify an RX    Name of Caller:Brenda  Pharmacy Name:[facility]  Prescription Name:amoxicillin (AMOXIL) 250 mg/5 mL suspension  What do they need to clarify?:directions of usage  Best Call Back Number:  Additional Information:

## 2024-12-30 ENCOUNTER — OFFICE VISIT (OUTPATIENT)
Dept: OBSTETRICS AND GYNECOLOGY | Facility: CLINIC | Age: 19
End: 2024-12-30
Payer: COMMERCIAL

## 2024-12-30 VITALS
DIASTOLIC BLOOD PRESSURE: 78 MMHG | SYSTOLIC BLOOD PRESSURE: 120 MMHG | HEIGHT: 63 IN | WEIGHT: 131.63 LBS | BODY MASS INDEX: 23.32 KG/M2

## 2024-12-30 DIAGNOSIS — Z12.4 SCREENING FOR CERVICAL CANCER: ICD-10-CM

## 2024-12-30 DIAGNOSIS — R35.0 URINARY FREQUENCY: ICD-10-CM

## 2024-12-30 DIAGNOSIS — Z01.419 ROUTINE GYNECOLOGICAL EXAMINATION: Primary | ICD-10-CM

## 2024-12-30 DIAGNOSIS — Z11.3 ROUTINE SCREENING FOR STI (SEXUALLY TRANSMITTED INFECTION): ICD-10-CM

## 2024-12-30 LAB
BILIRUB SERPL-MCNC: NEGATIVE MG/DL
BLOOD, POC UA: NEGATIVE
GLUCOSE UR QL STRIP: NORMAL
KETONES UR QL STRIP: NEGATIVE
LEUKOCYTE ESTERASE URINE, POC: NEGATIVE
NITRITE, POC UA: NEGATIVE
PH, POC UA: 5
PROTEIN, POC: NORMAL
SPECIFIC GRAVITY, POC UA: 1.02
UROBILINOGEN, POC UA: NORMAL

## 2024-12-30 PROCEDURE — 99999 PR PBB SHADOW E&M-EST. PATIENT-LVL III: CPT | Mod: PBBFAC,,, | Performed by: NURSE PRACTITIONER

## 2024-12-30 PROCEDURE — 87591 N.GONORRHOEAE DNA AMP PROB: CPT | Performed by: NURSE PRACTITIONER

## 2024-12-31 LAB
C TRACH DNA SPEC QL NAA+PROBE: NOT DETECTED
N GONORRHOEA DNA SPEC QL NAA+PROBE: NOT DETECTED

## 2025-01-16 ENCOUNTER — PATIENT OUTREACH (OUTPATIENT)
Dept: ADMINISTRATIVE | Facility: HOSPITAL | Age: 20
End: 2025-01-16
Payer: COMMERCIAL